# Patient Record
Sex: MALE | Race: OTHER | HISPANIC OR LATINO | URBAN - METROPOLITAN AREA
[De-identification: names, ages, dates, MRNs, and addresses within clinical notes are randomized per-mention and may not be internally consistent; named-entity substitution may affect disease eponyms.]

---

## 2023-06-06 ENCOUNTER — EMERGENCY (EMERGENCY)
Facility: HOSPITAL | Age: 48
LOS: 1 days | Discharge: HOME | End: 2023-06-06
Attending: EMERGENCY MEDICINE | Admitting: EMERGENCY MEDICINE
Payer: COMMERCIAL

## 2023-06-06 VITALS
TEMPERATURE: 98 F | SYSTOLIC BLOOD PRESSURE: 140 MMHG | HEART RATE: 89 BPM | DIASTOLIC BLOOD PRESSURE: 90 MMHG | OXYGEN SATURATION: 99 % | HEIGHT: 62.99 IN | WEIGHT: 179.9 LBS | RESPIRATION RATE: 18 BRPM

## 2023-06-06 VITALS
TEMPERATURE: 98 F | SYSTOLIC BLOOD PRESSURE: 135 MMHG | RESPIRATION RATE: 18 BRPM | DIASTOLIC BLOOD PRESSURE: 78 MMHG | OXYGEN SATURATION: 98 % | HEART RATE: 77 BPM

## 2023-06-06 PROCEDURE — 73620 X-RAY EXAM OF FOOT: CPT | Mod: 26,RT

## 2023-06-06 PROCEDURE — 29505 APPLICATION LONG LEG SPLINT: CPT

## 2023-06-06 PROCEDURE — 99284 EMERGENCY DEPT VISIT MOD MDM: CPT | Mod: 25

## 2023-06-06 PROCEDURE — 73610 X-RAY EXAM OF ANKLE: CPT

## 2023-06-06 PROCEDURE — 29515 APPLICATION SHORT LEG SPLINT: CPT | Mod: RT

## 2023-06-06 PROCEDURE — 73620 X-RAY EXAM OF FOOT: CPT

## 2023-06-06 PROCEDURE — 73610 X-RAY EXAM OF ANKLE: CPT | Mod: 26,RT

## 2023-06-06 RX ORDER — IBUPROFEN 200 MG
600 TABLET ORAL ONCE
Refills: 0 | Status: COMPLETED | OUTPATIENT
Start: 2023-06-06 | End: 2023-06-06

## 2023-06-06 RX ADMIN — Medication 600 MILLIGRAM(S): at 18:30

## 2023-06-06 RX ADMIN — Medication 600 MILLIGRAM(S): at 17:29

## 2023-06-06 NOTE — ED PROVIDER NOTE - CLINICAL SUMMARY MEDICAL DECISION MAKING FREE TEXT BOX
Patient is a 48-year-old male who presents to the emergency room complaining of right ankle pain.  Patient reports he was walking down a ramp while at work and he rolled his ankle over and behind him and heard a snap.  He reports that he has been experiencing pain and difficulty weightbearing since.  Denies any additional injury.  Denies striking his head or head injury.  Denies extremity numbness or weakness.  On exam patient sitting appears to be in pain.  Right lower extremity no tenderness to palpation to the knee.  Right ankle is visibly swollen with tenderness palpation of the lateral aspect.  No tenderness to palpation over the MTPs grossly.  Positive pedal pulse cap refill less than 2 seconds sensation grossly intact.  No gross Achilles reflex noted.  Patient presenting to the emergency room with a chief complaint of right ankle pain status post injury.  Neurovascular intact.  Will obtain x-ray imaging and monitor.  X-rays reviewed independently there is a nondisplaced oblique fracture of the distal fibula.  Patient placed in a splint crutches provided stable for discharge home with outpatient follow-up.

## 2023-06-06 NOTE — ED ADULT NURSE NOTE - NSFALLRISKINTERV_ED_ALL_ED

## 2023-06-06 NOTE — ED PROVIDER NOTE - OBJECTIVE STATEMENT
Patient is a 48-year-old male with no past medical history coming complaining of right ankle pain.  Patient states he was walking down a ramp tripped roller his foot came underneath him.  Patient denies any other injuries denies any numbness tingling weakness denies hitting his head. Patient is a 48-year-old male with no past medical history coming complaining of right ankle pain.  Patient states he was walking down a ramp while at work tripped rolled his ankle head snap and foot came underneath him.  Patient denies any other injuries denies any numbness tingling weakness denies hitting his head.

## 2023-06-06 NOTE — ED PROVIDER NOTE - PATIENT PORTAL LINK FT
You can access the FollowMyHealth Patient Portal offered by [facility] by registering at the following website: [email or web address]. By joining [facility]’s FollowMyHealth portal, you will also be able to view your health information using other applications (apps) compatible with our system.

## 2023-06-06 NOTE — ED PROVIDER NOTE - NSFOLLOWUPINSTRUCTIONS_ED_ALL_ED_FT
do not wet splint  elevate ice Take Tylenol or Motrin for pain  follow up with orthopedics  return to er for any worsening symptoms     Fractura de tobillo  Ankle Fracture    La articulación del tobillo se compone de las secciones inferiores (distales) de los huesos de la parte inferior de la pierna, que se llaman tibia y peroné, junto con un hueso del pie que se llama astrágalo. Shae fractura de tobillo es la ruptura de shae, dos o las paolo secciones del hueso. Hay dos tipos generales de fracturas de tobillo:  Fractura estable. Sucede cuando se rompe mansoor de los huesos, rosanna los huesos de la articulación del tobillo se mantienen en la posición normal.  Fractura inestable. Drea tipo puede implicar más de un hueso roto. También puede ocurrir si el hueso exterior está roto y los tejidos yong que conectan los huesos entre ellos (ligamentos) también están dañados en la parte interna del tobillo. Drea tipo de fractura hace que el astrágalo se salga de rojas posición normal.  ¿Cuáles son las causas?  Esta afección puede ser causada por lo siguiente:  Un golpe joo y directo en el tobillo.  Torcerse rápida y gravemente el tobillo, por lo general, mientras el pie está apoyado y el derrek del cuerpo está en movimiento.  Un traumatismo, melanie un choque automovilístico o shae caída desde shae altura importante.  ¿Qué incrementa el riesgo?  Los siguientes factores pueden hacer que sea más propenso a desarrollar esta afección:  Tener sobrepeso.  Practicar deportes que involucran cambios bruscos de dirección, melanie el fútbol.  Practicar deportes de alto impacto, melanie la gimnasia artística o el fútbol americano.  ¿Cuáles son los signos o síntomas?    Los síntomas de esta afección incluyen:  Sensibilidad e hinchazón en el tobillo.  Moretones alrededor del tobillo lesionado.  Dolor al ejercer presión sobre el tobillo o al moverlo.  Dificultad al caminar o al usar el tobillo para soportar el peso del cuerpo (poner peso sobre el tobillo).  Dolor que empeora cuando mueve el pie o el tobillo o cuando se pone de pie.  Dolor que disminuye con el reposo.  ¿Cómo se diagnostica?  Generalmente, la fractura de tobillo se diagnostica mediante un examen físico y radiografías. También se puede realizar shae exploración por tomografía computarizada (TC) o shae resonancia magnética (RM).    ¿Cómo se trata?  El tratamiento de esta afección depende del tipo de fractura de tobillo que tenga. Las fracturas estables se tratan con un yeso, shae bota o shae férula para inmovilizar el tobillo, y muletas para no colocar peso en el tobillo hasta que se consolide la fractura. Las fracturas inestables requieren cirugía para asegurarse de que los huesos se consoliden correctamente. Luego de la cirugía, usará shae férula. Shae vez que la incisión se haya curado, el cirujano puede colocarle un yeso o shae bota. No podrá apoyar peso en el lado lesionado por varias semanas.    Después de que el tobillo haya sanado, deberá hacer ejercicios de fisioterapia para mejorar la movilidad y la fuerza del tobillo.    Siga estas instrucciones en rojas casa:  Si tiene shae bota o shae férula:    Use la bota o la férula melanie se lo haya indicado el médico. Quíteselas solamente melanie se lo haya indicado el médico.  Aflójelas si siente hormigueo en los dedos de los pies, se le adormecen o se le enfrían y se tornan azulados.  Manténgalas limpias y secas.  Si tiene un yeso:    No ejerza presión en ninguna parte del yeso hasta que se haya endurecido por completo. Albia puede tardar varias horas.  No introduzca nada dentro del yeso para rascarse la piel. Albia puede aumentar el riesgo de contraer shae infección.  Controle la piel alrededor del yeso todos los días. Informe al médico acerca de cualquier inquietud.  Puede aplicar shae loción en la piel seca alrededor de los bordes del yeso. No aplique loción en la piel por debajo del yeso.  Manténgalo limpio y seco.  Bañarse    No tome susana de inmersión, no nade ni use el jacuzzi hasta que el médico lo autorice. Pregúntele al médico si puede ducharse. Bar vez solo le permitan darse susana de esponja.  Si el yeso, la bota o la férula no son impermeables:  No deje que se mojen.  Cúbralos con un envoltorio hermético cuando tome un baño de inmersión o shae ducha.  Control del dolor, la rigidez y la hinchazón      Si se lo indican, aplique hielo sobre la kamari de la lesión. Para hacer esto:  Si tiene shae férula o bota desmontable, quíteselas melanie se lo haya indicado el médico.  Ponga el hielo en shae bolsa plástica.  Coloque shae toalla entre la piel y la bolsa de hielo o el yeso y la bolsa de hielo.  Aplique el hielo nic 20 minutos, 2 o 3 veces por día.  Retire el hielo si la piel se pone de color guido brillante. Albia es muy importante. Si no puede sentir dolor, calor o frío, tiene un mayor riesgo de que se dañe la kamari.  Mueva los dedos del pie con frecuencia para reducir la rigidez y la hinchazón.  Cuando esté sentado o acostado, levante (eleve) la kamari lesionada por encima del nivel del corazón.  Actividad    Rowan ejercicio melanie se lo haya indicado el médico.  Retome laura actividades normales según lo indicado por el médico. Pregúntele al médico qué actividades son seguras para usted.  No apoye el peso del cuerpo sobre la extremidad lesionada hasta que lo autorice el médico. Use las muletas melanie se lo haya indicado el médico.  Indicaciones generales    Use los medicamentos de venta carmela y los recetados solamente melanie se lo haya indicado el médico.  Pregúntele al médico cuándo puede volver a conducir, si tiene un yeso, shae bota o shae férula en el tobillo.  No consuma ningún producto que contenga nicotina o tabaco, melanie cigarrillos, cigarrillos electrónicos y tabaco de mascar. Estos pueden retrasar la consolidación del hueso. Si necesita ayuda para dejar de consumir estos productos, consulte al médico.  Cumpla con todas las visitas de seguimiento. Albia es importante.  Comuníquese con un médico si:  Tiene dolor o hinchazón que empeoran, o que no mejoran con reposo o medicamentos.  Rojas yeso se daña.  Solicite ayuda de inmediato si:  Tiene un dolor intenso que perdura.  Tiene dolor o hinchazón nuevos.  La piel o las uñas del pie que están por debajo de la lesión se vuelven de color ryan o maria fernanda, están frías, se adormecen o tienen menos sensibilidad al tacto.  Resumen  Shae fractura de tobillo puede ser estable o inestable. Albia se determina luego de un examen físico y estudios de diagnóstico por imágenes, melanie shae radiografía, shae TC o shae RM.  Las fracturas estables se tratan con un yeso, shae bota o shae férula para inmovilizar el tobillo hasta que se consolide la fractura. Las fracturas inestables requieren cirugía para asegurarse de que los huesos se consoliden correctamente.  No podrá apoyar peso en el lado lesionado por varias semanas.  Ly medicamentos, colocarse hielo y levantar (elevar) el tobillo lesionado al sentarse o estar acostado ayuda a aliviar el dolor. Siga las instrucciones que le haya dado rojas médico.  Esta información no tiene melanie fin reemplazar el consejo del médico. Asegúrese de hacerle al médico cualquier pregunta que tenga.

## 2023-06-06 NOTE — ED PROVIDER NOTE - DIFFERENTIAL DIAGNOSIS
Patient presenting to the emergency room with a chief complaint of right ankle pain status post injury.  Concern for fracture vs sprain vs strain vs ligament injury. Neurovascular intact.  Will obtain x-ray imaging and monitor. Differential Diagnosis

## 2023-06-06 NOTE — ED ADULT NURSE NOTE - OBJECTIVE STATEMENT
Pt came in complains of right ankle pain and swelling. Pt reported that he was work in ground as a  and accidentally twisted his right ankle. Pt presented with pain and swelling of the right ankle Pt came in complains of right ankle pain and swelling. 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  Pt presented with pain and swelling of the right ankle Pt assessed via  service # [id number] Jas

## 2023-06-06 NOTE — ED PROVIDER NOTE - CARE PROVIDER_API CALL
Dewayne Razo  Orthopaedic Surgery  [address], [address]  [place] [address]  Phone: [phone]  Fax: [phone]  Follow Up Time: